# Patient Record
Sex: FEMALE | Race: WHITE | NOT HISPANIC OR LATINO | Employment: OTHER | URBAN - METROPOLITAN AREA
[De-identification: names, ages, dates, MRNs, and addresses within clinical notes are randomized per-mention and may not be internally consistent; named-entity substitution may affect disease eponyms.]

---

## 2017-05-12 ENCOUNTER — FOLLOW UP (OUTPATIENT)
Dept: URBAN - METROPOLITAN AREA CLINIC 26 | Facility: CLINIC | Age: 82
End: 2017-05-12

## 2017-05-12 VITALS
HEART RATE: 80 BPM | WEIGHT: 165 LBS | DIASTOLIC BLOOD PRESSURE: 70 MMHG | BODY MASS INDEX: 38.18 KG/M2 | HEIGHT: 55 IN | SYSTOLIC BLOOD PRESSURE: 130 MMHG

## 2017-05-12 DIAGNOSIS — H35.3232: ICD-10-CM

## 2017-05-12 DIAGNOSIS — H35.3134: ICD-10-CM

## 2017-05-12 DIAGNOSIS — H04.123: ICD-10-CM

## 2017-05-12 DIAGNOSIS — H35.61: ICD-10-CM

## 2017-05-12 DIAGNOSIS — H43.813: ICD-10-CM

## 2017-05-12 PROCEDURE — 4177F TALK PT/CRGVR RE AREDS PREV: CPT

## 2017-05-12 PROCEDURE — 1036F TOBACCO NON-USER: CPT

## 2017-05-12 PROCEDURE — 92014 COMPRE OPH EXAM EST PT 1/>: CPT

## 2017-05-12 PROCEDURE — G8427 DOCREV CUR MEDS BY ELIG CLIN: HCPCS

## 2017-05-12 PROCEDURE — 92250 FUNDUS PHOTOGRAPHY W/I&R: CPT

## 2017-05-12 PROCEDURE — G8417 CALC BMI ABV UP PARAM F/U: HCPCS

## 2017-05-12 PROCEDURE — 2019F DILATED MACUL EXAM DONE: CPT

## 2017-05-12 PROCEDURE — 92134 CPTRZ OPH DX IMG PST SGM RTA: CPT

## 2017-05-12 ASSESSMENT — VISUAL ACUITY
OS_CC: 20/400
OD_CC: CF 3FT

## 2017-05-12 ASSESSMENT — TONOMETRY
OS_IOP_MMHG: 17
OD_IOP_MMHG: 15

## 2017-11-09 ENCOUNTER — FOLLOW UP (OUTPATIENT)
Dept: URBAN - METROPOLITAN AREA CLINIC 26 | Facility: CLINIC | Age: 82
End: 2017-11-09

## 2017-11-09 VITALS — SYSTOLIC BLOOD PRESSURE: 122 MMHG | HEIGHT: 55 IN | DIASTOLIC BLOOD PRESSURE: 71 MMHG | HEART RATE: 62 BPM

## 2017-11-09 DIAGNOSIS — H35.3232: ICD-10-CM

## 2017-11-09 DIAGNOSIS — H35.3134: ICD-10-CM

## 2017-11-09 DIAGNOSIS — H35.61: ICD-10-CM

## 2017-11-09 DIAGNOSIS — H04.123: ICD-10-CM

## 2017-11-09 DIAGNOSIS — H43.813: ICD-10-CM

## 2017-11-09 PROCEDURE — 92014 COMPRE OPH EXAM EST PT 1/>: CPT

## 2017-11-09 PROCEDURE — 92250 FUNDUS PHOTOGRAPHY W/I&R: CPT

## 2017-11-09 PROCEDURE — G8417 CALC BMI ABV UP PARAM F/U: HCPCS

## 2017-11-09 PROCEDURE — G8427 DOCREV CUR MEDS BY ELIG CLIN: HCPCS

## 2017-11-09 PROCEDURE — 92134 CPTRZ OPH DX IMG PST SGM RTA: CPT

## 2017-11-09 PROCEDURE — 4177F TALK PT/CRGVR RE AREDS PREV: CPT

## 2017-11-09 PROCEDURE — 2019F DILATED MACUL EXAM DONE: CPT

## 2017-11-09 PROCEDURE — 1036F TOBACCO NON-USER: CPT

## 2017-11-09 ASSESSMENT — TONOMETRY
OD_IOP_MMHG: 12
OS_IOP_MMHG: 13

## 2017-11-09 ASSESSMENT — VISUAL ACUITY
OD_CC: CF 2FT
OS_CC: 20/100

## 2017-11-28 NOTE — PATIENT DISCUSSION
(H00.024) Hordeolum internum left upper eyelid - Assesment : Examination revealed Hordeolum Internum SACHIN. - Plan : Hot compresses as often as possible until improves. Advised may take several weeks to resolve. Call if worsens or does not improve.

## 2017-11-28 NOTE — PATIENT DISCUSSION
(H10.023) Other mucopurulent conjunctivitis, bilateral - Assesment : Examination revealed other mucopurulent conjunctivitis OU. - Plan : Start Besivance tid OU for 1 week. E-Rx sent to pharmacy. Coupon and sample given today. Advised may take a few weeks to resolve completely. Call if symptoms worsen, do not improve, or new symptoms or vision changes occur. RTC as scheduled, sooner if problems.

## 2018-05-11 ENCOUNTER — FOLLOW UP (OUTPATIENT)
Dept: URBAN - METROPOLITAN AREA CLINIC 26 | Facility: CLINIC | Age: 83
End: 2018-05-11

## 2018-05-11 VITALS
WEIGHT: 153 LBS | HEART RATE: 67 BPM | BODY MASS INDEX: 35.41 KG/M2 | SYSTOLIC BLOOD PRESSURE: 96 MMHG | DIASTOLIC BLOOD PRESSURE: 65 MMHG | HEIGHT: 55 IN

## 2018-05-11 DIAGNOSIS — H43.813: ICD-10-CM

## 2018-05-11 DIAGNOSIS — H35.3232: ICD-10-CM

## 2018-05-11 DIAGNOSIS — H35.3134: ICD-10-CM

## 2018-05-11 DIAGNOSIS — H35.61: ICD-10-CM

## 2018-05-11 PROCEDURE — 92250 FUNDUS PHOTOGRAPHY W/I&R: CPT

## 2018-05-11 PROCEDURE — 92014 COMPRE OPH EXAM EST PT 1/>: CPT

## 2018-05-11 ASSESSMENT — VISUAL ACUITY
OD_CC: 20/200-2
OS_CC: 20/200-1

## 2018-05-11 ASSESSMENT — TONOMETRY
OD_IOP_MMHG: 14
OS_IOP_MMHG: 13

## 2018-11-15 ENCOUNTER — FOLLOW UP (OUTPATIENT)
Dept: URBAN - METROPOLITAN AREA CLINIC 26 | Facility: CLINIC | Age: 83
End: 2018-11-15

## 2018-11-15 DIAGNOSIS — H35.61: ICD-10-CM

## 2018-11-15 DIAGNOSIS — H35.3232: ICD-10-CM

## 2018-11-15 DIAGNOSIS — H35.3134: ICD-10-CM

## 2018-11-15 DIAGNOSIS — H43.813: ICD-10-CM

## 2018-11-15 PROCEDURE — 92014 COMPRE OPH EXAM EST PT 1/>: CPT

## 2018-11-15 PROCEDURE — 92250 FUNDUS PHOTOGRAPHY W/I&R: CPT

## 2018-11-15 ASSESSMENT — VISUAL ACUITY
OD_CC: 20/200
OS_CC: 20/200+1

## 2018-11-15 ASSESSMENT — TONOMETRY
OD_IOP_MMHG: 14
OS_IOP_MMHG: 14

## 2019-05-17 ENCOUNTER — FOLLOW UP (OUTPATIENT)
Dept: URBAN - METROPOLITAN AREA CLINIC 26 | Facility: CLINIC | Age: 84
End: 2019-05-17

## 2019-05-17 VITALS — HEIGHT: 55 IN | BODY MASS INDEX: 31.47 KG/M2 | WEIGHT: 136 LBS

## 2019-05-17 DIAGNOSIS — H35.61: ICD-10-CM

## 2019-05-17 DIAGNOSIS — H43.813: ICD-10-CM

## 2019-05-17 DIAGNOSIS — H35.3232: ICD-10-CM

## 2019-05-17 PROCEDURE — 92250 FUNDUS PHOTOGRAPHY W/I&R: CPT

## 2019-05-17 PROCEDURE — 92014 COMPRE OPH EXAM EST PT 1/>: CPT

## 2019-05-17 ASSESSMENT — TONOMETRY
OS_IOP_MMHG: 11
OD_IOP_MMHG: 10

## 2019-05-17 ASSESSMENT — VISUAL ACUITY
OS_CC: 20/100-2
OD_CC: 20/200

## 2019-08-30 NOTE — PATIENT DISCUSSION
(H25.13) Age-related nuclear cataract, bilateral - Assesment : Examination revealed cataract. Mild symptoms. - Plan : Monitor for changes. Updated GLRx given today. Advised patient to call our office with decreased vision or increased symptoms. RTC in 1 year for Exam, sooner if problems or changes occur.

## 2020-09-08 NOTE — PATIENT DISCUSSION
Advised Pt of condition of cataracts and discussed option of CE to improve visual function. Pt prefers to wait at this time and will call if decides would like to proceed. Discussed symptoms of worsening and becoming more bothersome. CE folder given.

## 2020-09-08 NOTE — PATIENT DISCUSSION
The cataracts are responsible for the patient's decrease in vision and glare. Pt is bothered and symptomatic.

## 2022-07-09 ENCOUNTER — TELEPHONE ENCOUNTER (OUTPATIENT)
Dept: URBAN - METROPOLITAN AREA CLINIC 121 | Facility: CLINIC | Age: 87
End: 2022-07-09

## 2022-07-10 ENCOUNTER — TELEPHONE ENCOUNTER (OUTPATIENT)
Dept: URBAN - METROPOLITAN AREA CLINIC 121 | Facility: CLINIC | Age: 87
End: 2022-07-10